# Patient Record
Sex: MALE | Race: WHITE | Employment: FULL TIME | RURAL
[De-identification: names, ages, dates, MRNs, and addresses within clinical notes are randomized per-mention and may not be internally consistent; named-entity substitution may affect disease eponyms.]

---

## 2017-04-06 NOTE — LETTER
4/7/2017 4:26 PM 
 
Mr. Hakan Young [address] [address] Dear  Jenny Meeter: 
 
Guevara Herrera missed you! Please call our office at [phone] and schedule a follow up appointment for your continued care. Sincerely, DAYANARA GOMEZ & GALO MATTHEWS [facility] [place]

## 2017-04-07 RX ORDER — LISINOPRIL 20 MG/1
TABLET ORAL
Qty: 30 TAB | Refills: 0 | Status: SHIPPED | OUTPATIENT
Start: 2017-04-07 | End: 2017-05-12 | Stop reason: SDUPTHER

## 2017-05-12 RX ORDER — LISINOPRIL 20 MG/1
TABLET ORAL
Qty: 10 TAB | Refills: 0 | Status: SHIPPED | OUTPATIENT
Start: 2017-05-12 | End: 2017-06-12 | Stop reason: SDUPTHER

## 2017-06-12 ENCOUNTER — OFFICE VISIT (OUTPATIENT)
Dept: FAMILY MEDICINE CLINIC | Age: 32
End: 2017-06-12

## 2017-06-12 VITALS
OXYGEN SATURATION: 96 % | RESPIRATION RATE: 20 BRPM | SYSTOLIC BLOOD PRESSURE: 136 MMHG | DIASTOLIC BLOOD PRESSURE: 87 MMHG | HEART RATE: 76 BPM | HEIGHT: 74 IN | BODY MASS INDEX: 40.43 KG/M2 | WEIGHT: 315 LBS | TEMPERATURE: 97.2 F

## 2017-06-12 DIAGNOSIS — Z72.0 TOBACCO CHEW USE: ICD-10-CM

## 2017-06-12 DIAGNOSIS — I10 ESSENTIAL HYPERTENSION: Primary | ICD-10-CM

## 2017-06-12 DIAGNOSIS — K21.00 GASTROESOPHAGEAL REFLUX DISEASE WITH ESOPHAGITIS: ICD-10-CM

## 2017-06-12 DIAGNOSIS — F41.9 ANXIETY: ICD-10-CM

## 2017-06-12 RX ORDER — OMEPRAZOLE 40 MG/1
40 CAPSULE, DELAYED RELEASE ORAL DAILY
Qty: 30 CAP | Refills: 11 | Status: SHIPPED | OUTPATIENT
Start: 2017-06-12 | End: 2018-03-08 | Stop reason: SDUPTHER

## 2017-06-12 RX ORDER — LISINOPRIL 20 MG/1
TABLET ORAL
Qty: 10 TAB | Refills: 0 | Status: SHIPPED | OUTPATIENT
Start: 2017-06-12 | End: 2017-11-20 | Stop reason: SDUPTHER

## 2017-06-12 RX ORDER — BUSPIRONE HYDROCHLORIDE 15 MG/1
15 TABLET ORAL
Qty: 90 TAB | Refills: 3 | Status: SHIPPED | OUTPATIENT
Start: 2017-06-12 | End: 2019-11-21

## 2017-06-12 NOTE — MR AVS SNAPSHOT
Visit Information Date & Time Provider Department Dept. Phone Encounter #  
 6/12/2017 10:20 AM Brad Salazar MD [facility] [id number] Follow-up Instructions Return in about 6 months (around 12/12/2017) for f/u HTN, anxiety, GERD. Upcoming Health Maintenance Date Due DTaP/Tdap/Td series (1 - Tdap) 1/9/2007 INFLUENZA AGE 9 TO ADULT 8/1/2017 Allergies as of 6/12/2017  Review Complete On: 6/12/2017 By: Pearl Raman LPN Severity Noted Reaction Type Reactions Percocet [Oxycodone-acetaminophen] Medium 02/17/2014   Topical Itching Current Immunizations  Reviewed on 2/12/2014 Name Date Influenza Vaccine (Madin Chen Canine Kidney) PF 10/11/2014 11:47 AM  
  
 Not reviewed this visit You Were Diagnosed With   
  
 Codes Comments Essential hypertension    -  Primary ICD-10-CM: I10 
ICD-9-CM: 401.9 Gastroesophageal reflux disease with esophagitis     ICD-10-CM: K21.0 ICD-9-CM: 530.11 Anxiety     ICD-10-CM: F41.9 ICD-9-CM: 300.00 Vitals BP Pulse Temp Resp Height(growth percentile) Weight(growth percentile) 136/87 (BP 1 Location: Left arm, BP Patient Position: Sitting) 76 97.2 °F (36.2 °C) (Oral) 20 6' 2\" (1.88 m) 318 lb (144.2 kg) SpO2 BMI Smoking Status 96% 40.83 kg/m2 Former Smoker Vitals History BMI and BSA Data Body Mass Index Body Surface Area  
 40.83 kg/m 2 2.74 m 2 Preferred Pharmacy Pharmacy Name Phone [facility] [address] [address] [phone] Your Updated Medication List  
  
   
This list is accurate as of: 6/12/17 10:56 AM.  Always use your most recent med list.  
  
  
  
  
 busPIRone 15 mg tablet Commonly known as:  BUSPAR Take 1 Tab by mouth three (3) times daily as needed. FISH OIL 1,000 mg Cap Generic drug:  omega-3 fatty acids-vitamin e  
 Take 1 capsule by mouth daily. lisinopril 20 mg tablet Commonly known as:  PRINIVIL, ZESTRIL  
TAKE ONE TABLET BY MOUTH ONCE DAILY  
  
 multivitamin tablet Commonly known as:  ONE A DAY Take 1 tablet by mouth daily. omeprazole 40 mg capsule Commonly known as:  PRILOSEC Take 1 Cap by mouth daily. Prescriptions Sent to Pharmacy Refills  
 lisinopril (PRINIVIL, ZESTRIL) 20 mg tablet 0 Sig: TAKE ONE TABLET BY MOUTH ONCE DAILY Class: Normal  
 Pharmacy: [place] [address] Ph #: [phone]  
 busPIRone (BUSPAR) 15 mg tablet 3 Sig: Take 1 Tab by mouth three (3) times daily as needed. Class: Normal  
 Pharmacy: [place] [address] Ph #: [phone] Route: Oral  
 omeprazole (PRILOSEC) 40 mg capsule 11 Sig: Take 1 Cap by mouth daily. Class: Normal  
 Pharmacy: [place] [address] Ph #: [phone] Route: Oral  
  
We Performed the Following LIPID PANEL [10944 CPT(R)] METABOLIC PANEL, COMPREHENSIVE [81981 CPT(R)] Follow-up Instructions Return in about 6 months (around 12/12/2017) for f/u HTN, anxiety, GERD. Patient Instructions DASH Diet: Care Instructions Your Care Instructions The DASH diet is an eating plan that can help lower your blood pressure. DASH stands for Dietary Approaches to Stop Hypertension. Hypertension is high blood pressure. The DASH diet focuses on eating foods that are high in calcium, potassium, and magnesium. These nutrients can lower blood pressure. The foods that are highest in these nutrients are fruits, vegetables, low-fat dairy products, nuts, seeds, and legumes. But taking calcium, potassium, and magnesium supplements instead of eating foods that are high in those nutrients does not have the same effect.  The DASH diet also includes whole grains, fish, and poultry. The DASH diet is one of several lifestyle changes your doctor may recommend to lower your high blood pressure. Your doctor may also want you to decrease the amount of sodium in your diet. Lowering sodium while following the DASH diet can lower blood pressure even further than just the DASH diet alone. Follow-up care is a key part of your treatment and safety. Be sure to make and go to all appointments, and call your doctor if you are having problems. It's also a good idea to know your test results and keep a list of the medicines you take. How can you care for yourself at home? Following the DASH diet · Eat 4 to 5 servings of fruit each day. A serving is 1 medium-sized piece of fruit, ½ cup chopped or canned fruit, 1/4 cup dried fruit, or 4 ounces (½ cup) of fruit juice. Choose fruit more often than fruit juice. · Eat 4 to 5 servings of vegetables each day. A serving is 1 cup of lettuce or raw leafy vegetables, ½ cup of chopped or cooked vegetables, or 4 ounces (½ cup) of vegetable juice. Choose vegetables more often than vegetable juice. · Get 2 to 3 servings of low-fat and fat-free dairy each day. A serving is 8 ounces of milk, 1 cup of yogurt, or 1 ½ ounces of cheese. · Eat 6 to 8 servings of grains each day. A serving is 1 slice of bread, 1 ounce of dry cereal, or ½ cup of cooked rice, pasta, or cooked cereal. Try to choose whole-grain products as much as possible. · Limit lean meat, poultry, and fish to 2 servings each day. A serving is 3 ounces, about the size of a deck of cards. · Eat 4 to 5 servings of nuts, seeds, and legumes (cooked dried beans, lentils, and split peas) each week. A serving is 1/3 cup of nuts, 2 tablespoons of seeds, or ½ cup of cooked beans or peas. · Limit fats and oils to 2 to 3 servings each day. A serving is 1 teaspoon of vegetable oil or 2 tablespoons of salad dressing. · Limit sweets and added sugars to 5 servings or less a week.  A serving is 1 tablespoon jelly or jam, ½ cup sorbet, or 1 cup of lemonade. · Eat less than 2,300 milligrams (mg) of sodium a day. If you limit your sodium to 1,500 mg a day, you can lower your blood pressure even more. Tips for success · Start small. Do not try to make dramatic changes to your diet all at once. You might feel that you are missing out on your favorite foods and then be more likely to not follow the plan. Make small changes, and stick with them. Once those changes become habit, add a few more changes. · Try some of the following: ¨ Make it a goal to eat a fruit or vegetable at every meal and at snacks. This will make it easy to get the recommended amount of fruits and vegetables each day. ¨ Try yogurt topped with fruit and nuts for a snack or healthy dessert. ¨ Add lettuce, tomato, cucumber, and onion to sandwiches. ¨ Combine a ready-made pizza crust with low-fat mozzarella cheese and lots of vegetable toppings. Try using tomatoes, squash, spinach, broccoli, carrots, cauliflower, and onions. ¨ Have a variety of cut-up vegetables with a low-fat dip as an appetizer instead of chips and dip. ¨ Sprinkle sunflower seeds or chopped almonds over salads. Or try adding chopped walnuts or almonds to cooked vegetables. ¨ Try some vegetarian meals using beans and peas. Add garbanzo or kidney beans to salads. Make burritos and tacos with mashed castillo beans or black beans. Where can you learn more? Go to http://mitra-megan.info/. Enter J142 in the search box to learn more about \"DASH Diet: Care Instructions. \" Current as of: March 23, 2016 Content Version: 11.2 © 5615-0803 [employer]. Care instructions adapted under license by [employer] (which disclaims liability or warranty for this information).  If you have questions about a medical condition or this instruction, always ask your healthcare professional. [employer] disclaims any warranty or liability for your use of this information. Introducing [facility]! Magdiel Roberts introduces [facility] patient portal. Now you can access parts of your medical record, email your doctor's office, and request medication refills online. 1. In your internet browser, go to [email or web address] 2. Click on the First Time User? Click Here link in the Sign In box. You will see the New Member Sign Up page. 3. Enter your [facility] Access Code exactly as it appears below. You will not need to use this code after youve completed the sign-up process. If you do not sign up before the expiration date, you must request a new code. · [facility] Access Code: [email or web address] Expires: 9/10/2017 10:30 AM 
 
4. Enter the last four digits of your Social Security Number (xxxx) and Date of Birth (mm/dd/yyyy) as indicated and click Submit. You will be taken to the next sign-up page. 5. Create a [facility] ID. This will be your [facility] login ID and cannot be changed, so think of one that is secure and easy to remember. 6. Create a [facility] password. You can change your password at any time. 7. Enter your Password Reset Question and Answer. This can be used at a later time if you forget your password. 8. Enter your e-mail address. You will receive e-mail notification when new information is available in [address] 9. Click Sign Up. You can now view and download portions of your medical record. 10. Click the Download Summary menu link to download a portable copy of your medical information. If you have questions, please visit the Frequently Asked Questions section of the [facility] website. Remember, [facility] is NOT to be used for urgent needs. For medical emergencies, dial 911. Now available from your iPhone and Android! Please provide this summary of care documentation to your next provider. Your primary care clinician is listed as Demetrius Rocha. If you have any questions after today's visit, please call [phone].

## 2017-06-12 NOTE — PROGRESS NOTES
Reviewed record in preparation for visit and have obtained necessary documentation. Patient did not bring medications to visit for review. Information provided on Advanced Directive, Living Will. Body mass index is 40.83 kg/(m^2).    Health Maintenance Due   Topic Date Due    DTaP/Tdap/Td series (1 - Tdap) 01/09/2007

## 2017-06-12 NOTE — PATIENT INSTRUCTIONS

## 2017-06-12 NOTE — PROGRESS NOTES
CC: f/u HTn and anxiety    HPI: Pt is a 33 y.o. male who presents for f/u HTN and anxiety. He had been well-controlled on Buspar in the past but ran out of his prescription. He takes it only occasionally. He ran out of his lisinopril 3 weeks ago. Pt works as a  and has a hard time making follow-up appointments because he is frequently out of town. He also has a history of GERD that is well-controlled on a PPI. He has done a trial off in the past and his symptoms returned. HTN:  Checking BPs at home?: YES  Range of BPs?: 110-130's/70-80's  Adding salt to foods?: NO  Headaches?: NO  Blurry vision?: NO  Lower extremity edema?: NO  Smoking?: YES - working on quitting chewing tobacco. No longer smoking cigarettes        Past Medical History:   Diagnosis Date    GERD (gastroesophageal reflux disease)     HTN (hypertension) 1/20/2013    Hypertension        History reviewed. No pertinent family history.     Social History   Substance Use Topics    Smoking status: Former Smoker     Packs/day: 1.00     Years: 10.00     Quit date: 5/25/2012    Smokeless tobacco: Current User     Types: Snuff    Alcohol use 0.0 oz/week     0 Standard drinks or equivalent per week      Comment: Rare       ROS:  Positive only when bolded  Constitutional: HA, changes in weight  Eyes: Changes in vision  Cardiovascular: Palpitations  Gastrointestinal: Abd pain  Hematologic/lymphatic: NORBERTO    PE:  Visit Vitals    /87 (BP 1 Location: Left arm, BP Patient Position: Sitting)    Pulse 76    Temp 97.2 °F (36.2 °C) (Oral)    Resp 20    Ht 6' 2\" (1.88 m)    Wt 318 lb (144.2 kg)    SpO2 96%    BMI 40.83 kg/m2     Gen: Pt sitting in chair, in NAD  Head: Normocephalic, atraumatic  Eyes: Sclera anicteric, EOM grossly intact, PERRL  Throat: MMM, normal lips, tongue, teeth and gums  Neck: Supple, no LAD, no carotid bruits  CVS: Normal S1, S2, no m/r/g  Resp: CTAB, no wheezes or rales  Extrem: Atraumatic, no cyanosis or edema  Pulses: 2+   Skin: Warm, dry  Neuro: Alert, oriented, appropriate      A/P: Pt is a 33 y.o. male who presents for f/u HTN, GERD and anxiety. BP below goal today despite being out of medication for 3 weeks. Based on home readings he is well-controlled on his current dose and does not feel strongly about coming off medication, so will not attempt a trial off for now. - CMP  - Lipid panel  - Refill lisinopril, buspar and omeprazole  - RTC in 6 months for f/u chronic conditions, or sooner prn      The patient was counseled on the dangers of tobacco use, and was advised to quit. Reviewed strategies to maximize success, including removing cigarettes and smoking materials from environment, stress management and support of family/friends. Discussed diagnoses in detail with patient. Medication risks/benefits/side effects discussed with patient. All of the patient's questions were addressed. The patient understands and agrees with our plan of care. The patient knows to call back if they are unsure of or forget any changes we discussed today or if the symptoms change. The patient received an After-Visit Summary which contains VS, orders, medication list and allergy list. This can be used as a \"mini-medical record\" should they have to seek medical care while out of town. Current Outpatient Prescriptions on File Prior to Visit   Medication Sig Dispense Refill    multivitamin (ONE A DAY) tablet Take 1 tablet by mouth daily.  omega-3 fatty acids-vitamin e (FISH OIL) 1,000 mg cap Take 1 capsule by mouth daily. No current facility-administered medications on file prior to visit.

## 2017-06-13 LAB
ALBUMIN SERPL-MCNC: 4.4 G/DL (ref 3.5–5.5)
ALBUMIN/GLOB SERPL: 1.6 {RATIO} (ref 1.2–2.2)
ALP SERPL-CCNC: 73 IU/L (ref 39–117)
ALT SERPL-CCNC: 34 IU/L (ref 0–44)
AST SERPL-CCNC: 19 IU/L (ref 0–40)
BILIRUB SERPL-MCNC: 0.4 MG/DL (ref 0–1.2)
BUN SERPL-MCNC: 11 MG/DL (ref 6–20)
BUN/CREAT SERPL: 13 (ref 9–20)
CALCIUM SERPL-MCNC: 9.7 MG/DL (ref 8.7–10.2)
CHLORIDE SERPL-SCNC: 101 MMOL/L (ref 96–106)
CHOLEST SERPL-MCNC: 154 MG/DL (ref 100–199)
CO2 SERPL-SCNC: 23 MMOL/L (ref 18–29)
CREAT SERPL-MCNC: 0.88 MG/DL (ref 0.76–1.27)
GLOBULIN SER CALC-MCNC: 2.8 G/DL (ref 1.5–4.5)
GLUCOSE SERPL-MCNC: 96 MG/DL (ref 65–99)
HDLC SERPL-MCNC: 27 MG/DL
LDLC SERPL CALC-MCNC: 82 MG/DL (ref 0–99)
POTASSIUM SERPL-SCNC: 5.1 MMOL/L (ref 3.5–5.2)
PROT SERPL-MCNC: 7.2 G/DL (ref 6–8.5)
SODIUM SERPL-SCNC: 141 MMOL/L (ref 134–144)
TRIGL SERPL-MCNC: 227 MG/DL (ref 0–149)
VLDLC SERPL CALC-MCNC: 45 MG/DL (ref 5–40)

## 2017-07-13 ENCOUNTER — TELEPHONE (OUTPATIENT)
Dept: SLEEP MEDICINE | Age: 32
End: 2017-07-13

## 2017-07-13 NOTE — TELEPHONE ENCOUNTER
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

## 2017-07-13 NOTE — TELEPHONE ENCOUNTER
90 CPAP compliance download faxed to requested location. Download revealed 91% adherence between 4/14/17 and 7/12/17. AHI and leak are within normal limits.

## 2017-11-20 DIAGNOSIS — I10 ESSENTIAL HYPERTENSION: ICD-10-CM

## 2017-11-21 RX ORDER — LISINOPRIL 20 MG/1
TABLET ORAL
Qty: 30 TAB | Refills: 3 | Status: SHIPPED | OUTPATIENT
Start: 2017-11-21 | End: 2017-12-20 | Stop reason: SDUPTHER

## 2017-12-20 DIAGNOSIS — I10 ESSENTIAL HYPERTENSION: ICD-10-CM

## 2017-12-20 RX ORDER — LISINOPRIL 20 MG/1
TABLET ORAL
Qty: 30 TAB | Refills: 1 | Status: SHIPPED | OUTPATIENT
Start: 2017-12-20 | End: 2018-02-28 | Stop reason: SDUPTHER

## 2017-12-20 NOTE — TELEPHONE ENCOUNTER
Requested Prescriptions     Signed Prescriptions Disp Refills    lisinopril (PRINIVIL, ZESTRIL) 20 mg tablet 30 Tab 1     Sig: TAKE ONE TABLET BY MOUTH ONCE DAILY     Authorizing Provider: Ginny Prader D     Ordering User: Dima owens per Dr. Harrington Lesch

## 2017-12-20 NOTE — TELEPHONE ENCOUNTER
----- Message from Luis Eduardo Sun sent at 12/20/2017  9:53 AM EST -----  Regarding: Ramirez/Rx  Pts wife Nuno Alexander is requesting a Rx for Lisinopril called to [address] [place] [address]. She stated he is a  and he is coming though town today about 5:00pm.Pt has no medication left. Ms Pearl Jordan number is [phone].

## 2018-02-28 DIAGNOSIS — I10 ESSENTIAL HYPERTENSION: ICD-10-CM

## 2018-02-28 RX ORDER — LISINOPRIL 20 MG/1
TABLET ORAL
Qty: 30 TAB | Refills: 0 | Status: SHIPPED | OUTPATIENT
Start: 2018-02-28 | End: 2018-03-08 | Stop reason: SDUPTHER

## 2018-02-28 RX ORDER — LISINOPRIL 20 MG/1
TABLET ORAL
Qty: 30 TAB | Refills: 1 | Status: CANCELLED | OUTPATIENT
Start: 2018-02-28

## 2018-02-28 NOTE — TELEPHONE ENCOUNTER
Needs refill for Lisinopril. Patient is out of medication. Pharmacy is [facility] in [place]. Best contact number for patient is [phone] or  [phone]. Laray Sever is a  and is requesting for refill to be expedited. Last office visit was 6/12/17. Message taken by Call Center.

## 2018-03-01 NOTE — TELEPHONE ENCOUNTER
Called and informed patient's wife (on HIPPA) that Dr. Magui Long refilled his rx for 30 days, however, he needs an appointment to be seen. She states he will call next week to schedule one.

## 2018-03-08 ENCOUNTER — OFFICE VISIT (OUTPATIENT)
Dept: FAMILY MEDICINE CLINIC | Age: 33
End: 2018-03-08

## 2018-03-08 VITALS
WEIGHT: 315 LBS | DIASTOLIC BLOOD PRESSURE: 82 MMHG | TEMPERATURE: 97.5 F | HEIGHT: 74 IN | OXYGEN SATURATION: 97 % | BODY MASS INDEX: 40.43 KG/M2 | HEART RATE: 71 BPM | RESPIRATION RATE: 20 BRPM | SYSTOLIC BLOOD PRESSURE: 136 MMHG

## 2018-03-08 DIAGNOSIS — L98.9 SKIN LESION: ICD-10-CM

## 2018-03-08 DIAGNOSIS — I10 ESSENTIAL HYPERTENSION: Primary | ICD-10-CM

## 2018-03-08 DIAGNOSIS — K21.00 GASTROESOPHAGEAL REFLUX DISEASE WITH ESOPHAGITIS: ICD-10-CM

## 2018-03-08 PROBLEM — E66.01 OBESITY, MORBID (HCC): Status: ACTIVE | Noted: 2018-03-08

## 2018-03-08 RX ORDER — OMEPRAZOLE 40 MG/1
40 CAPSULE, DELAYED RELEASE ORAL DAILY
Qty: 90 CAP | Refills: 3 | Status: SHIPPED | OUTPATIENT
Start: 2018-03-08 | End: 2019-11-21

## 2018-03-08 RX ORDER — LISINOPRIL 20 MG/1
TABLET ORAL
Qty: 90 TAB | Refills: 3 | Status: SHIPPED | OUTPATIENT
Start: 2018-03-08 | End: 2019-03-23 | Stop reason: SDUPTHER

## 2018-03-08 NOTE — PATIENT INSTRUCTIONS
Skin Lesion Removal: What to Expect at Home  Your Recovery  After your procedure, you should not have much pain. But some soreness, swelling, or bruising is normal. Your doctor may recommend over-the-counter medicines to help with any discomfort. Most people can return to their normal routine the same day of their procedure. How quickly your wound heals depends on the size of your wound and the type of procedure you had. Most wounds take 1 to 3 weeks to heal. If you had laser surgery, your skin may change color and then slowly return to its normal color. You may need only a bandage, or you may need stitches. If you had stitches, your doctor will probably remove them 5 to 14 days later. If you have the type of stitches that dissolve, they do not have to be removed. They will disappear on their own. This care sheet gives you a general idea about how long it will take for you to recover. But each person recovers at a different pace. Follow the steps below to get better as quickly as possible. How can you care for yourself at home? Activity  ? · For the first few days, try not to bump or knock your wound. ? · Depending on where your wound is, you may need to avoid strenuous exercise for 2 weeks after the procedure or until your doctor says it is okay. ? · If you have had a lesion removed from your face, do not use makeup near your wound until you have your stitches taken out. ? · Ask your doctor when it is okay to shower, bathe, or swim. Medicines  ? · Your doctor will tell you if and when you can restart your medicines. He or she will also give you instructions about taking any new medicines. ? · If you take blood thinners, such as warfarin (Coumadin), clopidogrel (Plavix), or aspirin, be sure to talk to your doctor. He or she will tell you if and when to start taking those medicines again. Make sure that you understand exactly what your doctor wants you to do. ? · Be safe with medicines.  Take pain medicines exactly as directed. ¨ If the doctor gave you a prescription medicine for pain, take it as prescribed. ¨ If you are not taking a prescription pain medicine, ask your doctor if you can take an over-the-counter medicine. ? Wound care  ? · If your doctor told you how to care for your incision, follow your doctor's instructions. If you did not get instructions, follow this general advice:  ¨ Keep the wound bandaged and dry for the first day. ¨ After the first 24 to 48 hours, wash around the wound with clean water 2 times a day. Don't use hydrogen peroxide or alcohol, which can slow healing. ¨ You may cover the wound with a thin layer of petroleum jelly, such as Vaseline, and a nonstick bandage. ¨ Apply more petroleum jelly and replace the bandage as needed. ? · If you have stitches, you may get other instructions. ? · If a scab forms, do not pull it off. Let it fall off on its own. Wounds heal faster if no scab forms. Washing the area every day and using petroleum jelly will help prevent a scab from forming. ? · If the wound bleeds, put direct pressure on it with a clean cloth until the bleeding stops. ? · If you had a growth \"frozen\" off, you may get a blister. Do not break it. Let it dry up on its own. It is common for the blister to fill with blood. You do not need to do anything about this, but if it becomes too painful, call your doctor. ? · Avoid the sun until your stitches are removed. Follow-up care is a key part of your treatment and safety. Be sure to make and go to all appointments, and call your doctor if you are having problems. It's also a good idea to know your test results and keep a list of the medicines you take. When should you call for help? Call 911 anytime you think you may need emergency care. For example, call if:  ? · You passed out (lost consciousness). ? · You have severe trouble breathing.    ? · You have sudden chest pain and shortness of breath, or you cough up blood. ?Call your doctor now or seek immediate medical care if:  ? · You have symptoms of a blood clot in your leg (called a deep vein thrombosis), such as:  ¨ Pain in the calf, back of the knee, thigh, or groin. ¨ Redness and swelling in your leg or groin. ? · You have signs of infection, such as:  ¨ Increased pain, swelling, warmth, or redness. ¨ Red streaks leading from the wound. ¨ Pus draining from the wound. ¨ A fever. ? · You have pain that does not get better after you take pain medicine. ? · You have loose stitches. ? Watch closely for changes in your health, and be sure to contact your doctor if you have any problems. Where can you learn more? Go to http://mitra-megan.info/. Enter Q228 in the search box to learn more about \"Skin Lesion Removal: What to Expect at Home. \"  Current as of: October 13, 2016  Content Version: 11.4  © 3463-0634 [employer]. Care instructions adapted under license by [employer] (which disclaims liability or warranty for this information). If you have questions about a medical condition or this instruction, always ask your healthcare professional. [address] [address] any warranty or liability for your use of this information.

## 2018-03-08 NOTE — PROGRESS NOTES
Reviewed record in preparation for visit and have obtained necessary documentation. Patient did not bring medications to visit for review. Information provided on Advanced Directive, Living Will. Body mass index is 42.75 kg/(m^2). Health Maintenance Due   Topic Date Due    DTaP/Tdap/Td series (1 - Tdap) 01/09/2007    Influenza Age 5 to Adult  08/01/2017     1. Have you been to the ER, urgent care clinic since your last visit? Hospitalized since your last visit? no    2. Have you seen or consulted any other health care providers outside of the [address] [place] [address] since your last visit? Include any pap smears or colon screening.  no

## 2018-03-08 NOTE — MR AVS SNAPSHOT
[address] [address] 
[phone] Patient: Shelly Meyer MRN: [id number] :1985 Visit Information Date & Time Provider Department Dept. Phone Encounter #  
 3/8/2018  8:00 AM Lily De Leon MD [address] [place] [id number] Follow-up Instructions Return in about 6 months (around 2018) for f/u HTN. Upcoming Health Maintenance Date Due DTaP/Tdap/Td series (1 - Tdap) 2007 Influenza Age 5 to Adult 2017 Allergies as of 3/8/2018  Review Complete On: 3/8/2018 By: Huey Leonrado LPN Severity Noted Reaction Type Reactions Percocet [Oxycodone-acetaminophen] Medium 2014   Topical Itching Current Immunizations  Reviewed on 2014 Name Date Influenza Vaccine (Madin Chen Canine Kidney) PF 10/11/2014 11:47 AM  
  
 Not reviewed this visit You Were Diagnosed With   
  
 Codes Comments Essential hypertension    -  Primary ICD-10-CM: I10 
ICD-9-CM: 401.9 BMI 40.0-44.9, adult [facility])     ICD-10-CM: Z68.41 
ICD-9-CM: V85.41 Gastroesophageal reflux disease with esophagitis     ICD-10-CM: K21.0 ICD-9-CM: 530.11 Skin lesion     ICD-10-CM: L98.9 ICD-9-CM: 709.9 Vitals BP Pulse Temp Resp Height(growth percentile) Weight(growth percentile) 136/82 (BP 1 Location: Left arm, BP Patient Position: Sitting) 71 97.5 °F (36.4 °C) (Oral) 20 6' 2\" (1.88 m) 333 lb (151 kg) SpO2 BMI Smoking Status 97% 42.75 kg/m2 Former Smoker Vitals History BMI and BSA Data Body Mass Index Body Surface Area 42.75 kg/m 2 2.81 m 2 Preferred Pharmacy Pharmacy Name Phone [address] [address] [phone] Your Updated Medication List  
  
   
This list is accurate as of 3/8/18  8:44 AM.  Always use your most recent med list.  
  
  
  
  
 busPIRone 15 mg tablet Commonly known as:  BUSPAR Take 1 Tab by mouth three (3) times daily as needed. lisinopril 20 mg tablet Commonly known as:  PRINIVIL, ZESTRIL  
TAKE ONE TABLET BY MOUTH ONCE DAILY  
  
 omeprazole 40 mg capsule Commonly known as:  PRILOSEC Take 1 Cap by mouth daily. Prescriptions Sent to Pharmacy Refills  
 lisinopril (PRINIVIL, ZESTRIL) 20 mg tablet 3 Sig: TAKE ONE TABLET BY MOUTH ONCE DAILY Class: Normal  
 Pharmacy: [facility] [address] [facility] [address] [address] Ph #: [phone]  
 omeprazole (PRILOSEC) 40 mg capsule 3 Sig: Take 1 Cap by mouth daily. Class: Normal  
 Pharmacy: [facility] [address] [place] [address] Ph #: [phone] Route: Oral  
  
We Performed the Following METABOLIC PANEL, BASIC [57035 CPT(R)] Follow-up Instructions Return in about 6 months (around 9/8/2018) for f/u HTN. Patient Instructions Skin Lesion Removal: What to Expect at Home Your Recovery After your procedure, you should not have much pain. But some soreness, swelling, or bruising is normal. Your doctor may recommend over-the-counter medicines to help with any discomfort. Most people can return to their normal routine the same day of their procedure. How quickly your wound heals depends on the size of your wound and the type of procedure you had. Most wounds take 1 to 3 weeks to heal. If you had laser surgery, your skin may change color and then slowly return to its normal color. You may need only a bandage, or you may need stitches. If you had stitches, your doctor will probably remove them 5 to 14 days later. If you have the type of stitches that dissolve, they do not have to be removed. They will disappear on their own. This care sheet gives you a general idea about how long it will take for you to recover. But each person recovers at a different pace.  Follow the steps below to get better as quickly as possible. How can you care for yourself at home? Activity ? · For the first few days, try not to bump or knock your wound. ? · Depending on where your wound is, you may need to avoid strenuous exercise for 2 weeks after the procedure or until your doctor says it is okay. ? · If you have had a lesion removed from your face, do not use makeup near your wound until you have your stitches taken out. ? · Ask your doctor when it is okay to shower, bathe, or swim. Medicines ? · Your doctor will tell you if and when you can restart your medicines. He or she will also give you instructions about taking any new medicines. ? · If you take blood thinners, such as warfarin (Coumadin), clopidogrel (Plavix), or aspirin, be sure to talk to your doctor. He or she will tell you if and when to start taking those medicines again. Make sure that you understand exactly what your doctor wants you to do. ? · Be safe with medicines. Take pain medicines exactly as directed. ¨ If the doctor gave you a prescription medicine for pain, take it as prescribed. ¨ If you are not taking a prescription pain medicine, ask your doctor if you can take an over-the-counter medicine. ? Wound care ? · If your doctor told you how to care for your incision, follow your doctor's instructions. If you did not get instructions, follow this general advice: ¨ Keep the wound bandaged and dry for the first day. ¨ After the first 24 to 48 hours, wash around the wound with clean water 2 times a day. Don't use hydrogen peroxide or alcohol, which can slow healing. ¨ You may cover the wound with a thin layer of petroleum jelly, such as Vaseline, and a nonstick bandage. ¨ Apply more petroleum jelly and replace the bandage as needed. ? · If you have stitches, you may get other instructions. ? · If a scab forms, do not pull it off. Let it fall off on its own. Wounds heal faster if no scab forms. Washing the area every day and using petroleum jelly will help prevent a scab from forming. ? · If the wound bleeds, put direct pressure on it with a clean cloth until the bleeding stops. ? · If you had a growth \"frozen\" off, you may get a blister. Do not break it. Let it dry up on its own. It is common for the blister to fill with blood. You do not need to do anything about this, but if it becomes too painful, call your doctor. ? · Avoid the sun until your stitches are removed. Follow-up care is a key part of your treatment and safety. Be sure to make and go to all appointments, and call your doctor if you are having problems. It's also a good idea to know your test results and keep a list of the medicines you take. When should you call for help? Call 911 anytime you think you may need emergency care. For example, call if: 
? · You passed out (lost consciousness). ? · You have severe trouble breathing. ? · You have sudden chest pain and shortness of breath, or you cough up blood. ?Call your doctor now or seek immediate medical care if: 
? · You have symptoms of a blood clot in your leg (called a deep vein thrombosis), such as: 
¨ Pain in the calf, back of the knee, thigh, or groin. ¨ Redness and swelling in your leg or groin. ? · You have signs of infection, such as: 
¨ Increased pain, swelling, warmth, or redness. ¨ Red streaks leading from the wound. ¨ Pus draining from the wound. ¨ A fever. ? · You have pain that does not get better after you take pain medicine. ? · You have loose stitches. ? Watch closely for changes in your health, and be sure to contact your doctor if you have any problems. Where can you learn more? Go to http://mitra-megan.info/. Enter Q228 in the search box to learn more about \"Skin Lesion Removal: What to Expect at Home. \" Current as of: October 13, 2016 Content Version: 11.4 © 6815-5214 Healthwise, Incorporated. Care instructions adapted under license by [employer] (which disclaims liability or warranty for this information). If you have questions about a medical condition or this instruction, always ask your healthcare professional. [address] any warranty or liability for your use of this information. Introducing [facility]! Donarenita Ac introduces [employer] patient portal. Now you can access parts of your medical record, email your doctor's office, and request medication refills online. 1. In your internet browser, go to [email or web address] 2. Click on the First Time User? Click Here link in the Sign In box. You will see the New Member Sign Up page. 3. Enter your [employer] Access Code exactly as it appears below. You will not need to use this code after youve completed the sign-up process. If you do not sign up before the expiration date, you must request a new code. · [employer] Access Code: [email or web address] Expires: 6/6/2018  7:56 AM 
 
4. Enter the last four digits of your Social Security Number (xxxx) and Date of Birth (mm/dd/yyyy) as indicated and click Submit. You will be taken to the next sign-up page. 5. Create a [employer] ID. This will be your [employer] login ID and cannot be changed, so think of one that is secure and easy to remember. 6. Create a [employer] password. You can change your password at any time. 7. Enter your Password Reset Question and Answer. This can be used at a later time if you forget your password. 8. Enter your e-mail address. You will receive e-mail notification when new information is available in [address] 9. Click Sign Up. You can now view and download portions of your medical record. 10. Click the Download Summary menu link to download a portable copy of your medical information.  
 
If you have questions, please visit the Frequently Asked Questions section of the [employer]. Remember, [employer] is NOT to be used for urgent needs. For medical emergencies, dial 911. Now available from your iPhone and Android! Please provide this summary of care documentation to your next provider. Your primary care clinician is listed as [address] If you have any questions after today's visit, please call [phone].

## 2018-03-08 NOTE — PROGRESS NOTES
CC: f/u HTN and GERD    HPI: Pt is a 29 y.o. male who presents for f/u HTN and GERD. He is doing well on his current regimen but forgot to take his lisinopril yesterday. His GERD is well controlled on PPI. He has some moles and skin tags around his neck and his right axilla that he would like removed. HTN:  Checking BPs at home?: YES, occasionally, ranges 120's/80's. No logs today. 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 No pertinent family history. Social History   Substance Use Topics    Smoking status: Former Smoker     Packs/day: 1.00     Years: 10.00     Quit date: 5/25/2012    Smokeless tobacco: Current User     Types: Snuff    Alcohol use 0.0 oz/week     0 Standard drinks or equivalent per week      Comment: Rare       ROS:  Positive only when bolded  Constitutional: HA  Eyes: Changes in vision  Respiratory: SOB, wheezing, cough  Cardiovascular: CP, palpitations  Hematologic/lymphatic: NORBERTO    PE:  Visit Vitals    /82 (BP 1 Location: Left arm, BP Patient Position: Sitting)    Pulse 71    Temp 97.5 °F (36.4 °C) (Oral)    Resp 20    Ht 6' 2\" (1.88 m)    Wt 333 lb (151 kg)    SpO2 97%    BMI 42.75 kg/m2     Gen: Pt sitting in chair, in NAD  Head: Normocephalic, atraumatic  Eyes: Sclera anicteric, EOM grossly intact, PERRL  Throat: MMM, normal lips, tongue and gums  Neck: Supple  CVS: Normal S1, S2, no m/r/g  Resp: CTAB, no wheezes or rales  Extrem: Atraumatic, no cyanosis or edema  Pulses: 2+   Skin: Warm, dry. Two 2-3mm skin tags on left neck. One 3mm pigmented nevus anterior to right axilla. Neuro: Alert, oriented, appropriate      A/P: Pt is a 29 y.o. male who presents for f/u HTN and skin lesions.    - BMP  - Moles and skin tags removed with liquid nitrogen. Pt advised on normal course of healing and appearance of lesions during this time. Advised to call if he develops increasing erythema, drainage or fevers. - Will plan to check lipid panel yearly, due at next visit  - RTC in 6 months for f/u HTN      Discussed diagnoses in detail with patient. Medication risks/benefits/side effects discussed with patient. All of the patient's questions were addressed. The patient understands and agrees with our plan of care. The patient knows to call back if they are unsure of or forget any changes we discussed today or if the symptoms change. The patient received an After-Visit Summary which contains VS, orders, medication list and allergy list. This can be used as a \"mini-medical record\" should they have to seek medical care while out of town. Current Outpatient Prescriptions on File Prior to Visit   Medication Sig Dispense Refill    lisinopril (PRINIVIL, ZESTRIL) 20 mg tablet TAKE ONE TABLET BY MOUTH ONCE DAILY 30 Tab 0    busPIRone (BUSPAR) 15 mg tablet Take 1 Tab by mouth three (3) times daily as needed. 90 Tab 3    omeprazole (PRILOSEC) 40 mg capsule Take 1 Cap by mouth daily. 30 Cap 11    multivitamin (ONE A DAY) tablet Take 1 tablet by mouth daily.  omega-3 fatty acids-vitamin e (FISH OIL) 1,000 mg cap Take 1 capsule by mouth daily. No current facility-administered medications on file prior to visit.

## 2018-03-09 LAB
BUN SERPL-MCNC: 16 MG/DL (ref 6–20)
BUN/CREAT SERPL: 18 (ref 9–20)
CALCIUM SERPL-MCNC: 9.5 MG/DL (ref 8.7–10.2)
CHLORIDE SERPL-SCNC: 101 MMOL/L (ref 96–106)
CO2 SERPL-SCNC: 23 MMOL/L (ref 18–29)
CREAT SERPL-MCNC: 0.91 MG/DL (ref 0.76–1.27)
GFR SERPLBLD CREATININE-BSD FMLA CKD-EPI: 111 ML/MIN/1.73
GFR SERPLBLD CREATININE-BSD FMLA CKD-EPI: 128 ML/MIN/1.73
GLUCOSE SERPL-MCNC: 89 MG/DL (ref 65–99)
POTASSIUM SERPL-SCNC: 4.9 MMOL/L (ref 3.5–5.2)
SODIUM SERPL-SCNC: 141 MMOL/L (ref 134–144)

## 2018-07-09 ENCOUNTER — DOCUMENTATION ONLY (OUTPATIENT)
Dept: SLEEP MEDICINE | Age: 33
End: 2018-07-09

## 2019-04-29 ENCOUNTER — OFFICE VISIT (OUTPATIENT)
Dept: FAMILY MEDICINE CLINIC | Age: 34
End: 2019-04-29

## 2019-04-29 VITALS
SYSTOLIC BLOOD PRESSURE: 129 MMHG | WEIGHT: 315 LBS | RESPIRATION RATE: 24 BRPM | HEART RATE: 67 BPM | BODY MASS INDEX: 40.43 KG/M2 | OXYGEN SATURATION: 97 % | DIASTOLIC BLOOD PRESSURE: 78 MMHG | TEMPERATURE: 98.1 F | HEIGHT: 74 IN

## 2019-04-29 DIAGNOSIS — I10 ESSENTIAL HYPERTENSION: Primary | ICD-10-CM

## 2019-04-29 DIAGNOSIS — Z72.0 TOBACCO CHEW USE: ICD-10-CM

## 2019-04-29 RX ORDER — LISINOPRIL 20 MG/1
TABLET ORAL
Qty: 90 TAB | Refills: 1 | Status: SHIPPED | OUTPATIENT
Start: 2019-04-29 | End: 2019-11-01 | Stop reason: SDUPTHER

## 2019-04-29 NOTE — PROGRESS NOTES
1. Have you been to the ER, urgent care clinic since your last visit? Hospitalized since your last visit? No 
 
2. Have you seen or consulted any other health care providers outside of the [address] [place] [address] since your last visit? Include any pap smears or colon screening. No 
Reviewed record in preparation for visit and have necessary documentation Pt did not bring medication to office visit for review Goals that were addressed and/or need to be completed during or after this appointment include Health Maintenance Due Topic Date Due  
 DTaP/Tdap/Td series (1 - Tdap) 01/09/2007

## 2019-04-29 NOTE — PATIENT INSTRUCTIONS
Learning About High Blood Pressure What is high blood pressure? Blood pressure is a measure of how hard the blood pushes against the walls of your arteries. It's normal for blood pressure to go up and down throughout the day, but if it stays up, you have high blood pressure. Another name for high blood pressure is hypertension. Two numbers tell you your blood pressure. The first number is the systolic pressure. It shows how hard the blood pushes when your heart is pumping. The second number is the diastolic pressure. It shows how hard the blood pushes between heartbeats, when your heart is relaxed and filling with blood. Your doctor will give you a goal for your blood pressure. Your goal will be based on your health and your age. High blood pressure (hypertension) means that the top number stays high, or the bottom number stays high, or both. High blood pressure increases the risk of stroke, heart attack, and other problems. You and your doctor will talk about your risks of these problems based on your blood pressure. What happens when you have high blood pressure? · Blood flows through your arteries with too much force. Over time, this damages the walls of your arteries. But you can't feel it. High blood pressure usually doesn't cause symptoms. · Fat and calcium start to build up in your arteries. This buildup is called plaque. Plaque makes your arteries narrower and stiffer. Blood can't flow through them as easily. · This lack of good blood flow starts to damage some of the organs in your body. This can lead to problems such as coronary artery disease and heart attack, heart failure, stroke, kidney failure, and eye damage. How can you prevent high blood pressure? · Stay at a healthy weight. · Try to limit how much sodium you eat to less than 2,300 milligrams (mg) a day. If you limit your sodium to 1,500 mg a day, you can lower your blood pressure even more. ? Buy foods that are labeled \"unsalted,\" \"sodium-free,\" or \"low-sodium. \" Foods labeled \"reduced-sodium\" and \"light sodium\" may still have too much sodium. ? Flavor your food with garlic, lemon juice, onion, vinegar, herbs, and spices instead of salt. Do not use soy sauce, steak sauce, onion salt, garlic salt, mustard, or ketchup on your food. ? Use less salt (or none) when recipes call for it. You can often use half the salt a recipe calls for without losing flavor. · Be physically active. Get at least 30 minutes of exercise on most days of the week. Walking is a good choice. You also may want to do other activities, such as running, swimming, cycling, or playing tennis or team sports. · Limit alcohol to 2 drinks a day for men and 1 drink a day for women. · Eat plenty of fruits, vegetables, and low-fat dairy products. Eat less saturated and total fats. How is high blood pressure treated? · Your doctor will suggest making lifestyle changes. For example, your doctor may ask you to eat healthy foods, quit smoking, lose extra weight, and be more active. · If lifestyle changes don't help enough, your doctor may recommend that you take medicine. · When blood pressure is very high, medicines are needed to lower it. Follow-up care is a key part of your treatment and safety. Be sure to make and go to all appointments, and call your doctor if you are having problems. It's also a good idea to know your test results and keep a list of the medicines you take. Where can you learn more? Go to http://mitra-megan.info/. Enter P501 in the search box to learn more about \"Learning About High Blood Pressure. \" Current as of: July 22, 2018 Content Version: 11.9 © 2779-9927 [employer]. Care instructions adapted under license by [employer] (which disclaims liability or warranty for this information).  If you have questions about a medical condition or this instruction, always ask your healthcare professional. [address] [address] any warranty or liability for your use of this information.

## 2019-04-29 NOTE — PROGRESS NOTES
Patient: Melisa Castillo MRN: [id number]  SSN: [id number] YOB: 1985  Age: 36 y.o. Sex: male Subjective: Chief Complaint Patient presents with  Medication Refill HPI: he is a 36y.o. year old male who presents for medication refill. Patient [employer] health insurance. Last seen in office 1 year ago. Patient denies HA, dizziness, SOB, CP, abdominal pain, dysuria, myalgias or arthralgias. BP good today. He continues to gain weight. Encounter Diagnoses Name Primary?  Essential hypertension BP Readings from Last 3 Encounters:  
04/29/19 129/78  
03/08/18 136/82  
06/12/17 136/87 Wt Readings from Last 3 Encounters:  
04/29/19 336 lb (152.4 kg) 03/08/18 333 lb (151 kg) 06/12/17 318 lb (144.2 kg) Body mass index is 43.14 kg/m². Current and past medical information: 
 
Current Medications after this visit[de-identified]    
Current Outpatient Medications Medication Sig  
 lisinopril (PRINIVIL, ZESTRIL) 20 mg tablet TAKE 1 TABLET BY MOUTH ONCE DAILY  omeprazole (PRILOSEC) 40 mg capsule Take 1 Cap by mouth daily.  busPIRone (BUSPAR) 15 mg tablet Take 1 Tab by mouth three (3) times daily as needed. No current facility-administered medications for this visit. Patient Active Problem List  
 Diagnosis Date Noted  Obesity, morbid ([address] 03/08/2018  Former smoker 10/13/2014  Appendicitis, acute 02/12/2014  
 HTN (hypertension) 01/20/2013  Anxiety 01/20/2013  GERD (gastroesophageal reflux disease) 01/20/2013 Past Medical History:  
Diagnosis Date  GERD (gastroesophageal reflux disease)  HTN (hypertension) 1/20/2013  Hypertension Allergies Allergen Reactions  Percocet [Oxycodone-Acetaminophen] Itching Past Surgical History:  
Procedure Laterality Date  HX APPENDECTOMY  2/2014 Social History Socioeconomic History  Marital status:  Spouse name: Not on file  Number of children: Not on file  Years of education: Not on file  Highest education level: Not on file Tobacco Use  Smoking status: Former Smoker Packs/day: 1.00 Years: 10.00 Pack years: 10.00 Last attempt to quit: 2012 Years since quittin.9  Smokeless tobacco: Current User Types: Snuff Substance and Sexual Activity  Alcohol use: Yes Alcohol/week: 0.0 oz  
  Comment: Rare  Drug use: No  
 
 
 
Objective:  
 
Review of Systems: 
Constitutional: Negative for fatigue or malaise Derm: Negative for rash or lesion HEENT: Negative for acute hearing or vision changes Cardiovascular: Negative for dizziness, chest pain or palpitations Respiratory: Negative for cough, wheezing or SOB Gastrointestinal: Negative for nausea or abdominal pain Genital/urinary: Negative for dysuria or voiding dysfunction Muscoloskeletal: Negative for acute myalgias or arthralgias Neurological: Negative for headache, weakness or paresthesia Psychological: Negative for depression or anxiety Vitals:  
 19 1538 BP: 129/78 Pulse: 67 Resp: 24 Temp: 98.1 °F (36.7 °C) TempSrc: Oral  
SpO2: 97% Weight: 336 lb (152.4 kg) Height: 6' 2\" (1.88 m) Body mass index is 43.14 kg/m². Physical Exam: 
Constitutional: well developed, well nourished, in no acute distress Skin: warm and dry, normal tone and turgor Head: normocephalic, atraumatic Eyes: sclera clear, EOMI, PERRL Neck: normal range of motion Cardiovascular: normal S1, S2, regular rate and rhythm Respiratory: clear to auscultation bilaterally with symmetrical effort Abdomen: soft, BS normal 
Extremities: full range of motion Neurology: normal strength and sensation Psych: active, alert and oriented, affect appropriate Assessment and orders: ICD-10-CM ICD-9-CM 1. Essential hypertension I10 401.9 lisinopril (PRINIVIL, ZESTRIL) 20 mg tablet    METABOLIC PANEL, BASIC  
 
 
 
 Plan of care: 
Diagnoses were discussed in detail with patient. Strongly advised patient to stop all use of tobacco products. Recommend patient work on weight loss by a decrease in daily caloric intake and an increase in aerobic exercise. Medication risks/benefits/side effects discussed with patient. All of the patient's questions were addressed and answered to apparent satisfaction. The patient understands and agrees with our plan of care. The patient knows to call back if they have questions about the plan of care or if symptoms change. The patient received an After-Visit Summary which contains VS, diagnoses, orders, allergy and medication lists. Patient Care Team: 
Liz Cole MD as PCP - [facility]) Tejas Rich MD (Gastroenterology) Haroldo Moreno MD as Physician (Sleep Medicine) Mark Schwartz MD (Cardiology) Follow-up and Dispositions · Return in about 6 months (around 10/29/2019), or if symptoms worsen or fail to improve. Future Appointments Date Time Provider Jamarcus Muse 10/29/2019 10:00 AM Elroy Woo MD [facility] Signed By: Galina Rolon MD   
 April 29, 2019

## 2019-04-30 LAB
BUN SERPL-MCNC: 12 MG/DL (ref 6–20)
BUN/CREAT SERPL: 15 (ref 9–20)
CALCIUM SERPL-MCNC: 9 MG/DL (ref 8.7–10.2)
CHLORIDE SERPL-SCNC: 107 MMOL/L (ref 96–106)
CO2 SERPL-SCNC: 23 MMOL/L (ref 20–29)
CREAT SERPL-MCNC: 0.78 MG/DL (ref 0.76–1.27)
GLUCOSE SERPL-MCNC: 90 MG/DL (ref 65–99)
POTASSIUM SERPL-SCNC: 4.5 MMOL/L (ref 3.5–5.2)
SODIUM SERPL-SCNC: 141 MMOL/L (ref 134–144)

## 2019-11-01 DIAGNOSIS — I10 ESSENTIAL HYPERTENSION: ICD-10-CM

## 2019-11-01 RX ORDER — LISINOPRIL 20 MG/1
TABLET ORAL
Qty: 30 TAB | Refills: 0 | Status: SHIPPED | OUTPATIENT
Start: 2019-11-01 | End: 2019-11-21 | Stop reason: SDUPTHER

## 2019-11-01 NOTE — TELEPHONE ENCOUNTER
Pt's wife called and states she called and left a message concerning his medications on yesterday as well as Monday. He is a . She told him not to be without his medications on the road. This is why she called early. Her son is going to see him tomorrow. He will not be back in [place] until November 21st. She called and spoke to someone about getting a refill on his medication for his BP. He has one pill left.

## 2019-11-21 ENCOUNTER — HOSPITAL ENCOUNTER (OUTPATIENT)
Dept: LAB | Age: 34
Discharge: HOME | End: 2019-11-21

## 2019-11-21 ENCOUNTER — OFFICE VISIT (OUTPATIENT)
Dept: FAMILY MEDICINE CLINIC | Age: 34
End: 2019-11-21

## 2019-11-21 VITALS
HEART RATE: 69 BPM | DIASTOLIC BLOOD PRESSURE: 80 MMHG | SYSTOLIC BLOOD PRESSURE: 128 MMHG | RESPIRATION RATE: 20 BRPM | TEMPERATURE: 97 F | BODY MASS INDEX: 40.43 KG/M2 | HEIGHT: 74 IN | WEIGHT: 315 LBS | OXYGEN SATURATION: 96 %

## 2019-11-21 DIAGNOSIS — J40 BRONCHITIS: ICD-10-CM

## 2019-11-21 DIAGNOSIS — I10 ESSENTIAL HYPERTENSION: ICD-10-CM

## 2019-11-21 DIAGNOSIS — E78.2 MIXED HYPERLIPIDEMIA: ICD-10-CM

## 2019-11-21 DIAGNOSIS — I10 ESSENTIAL HYPERTENSION: Primary | ICD-10-CM

## 2019-11-21 RX ORDER — DOXYCYCLINE 100 MG/1
100 TABLET ORAL 2 TIMES DAILY
Qty: 20 TAB | Refills: 0 | Status: SHIPPED | OUTPATIENT
Start: 2019-11-21 | End: 2019-12-01

## 2019-11-21 RX ORDER — LISINOPRIL 20 MG/1
TABLET ORAL
Qty: 90 TAB | Refills: 1 | Status: SHIPPED | OUTPATIENT
Start: 2019-11-21 | End: 2020-06-08

## 2019-11-21 NOTE — PROGRESS NOTES
1. Have you been to the ER, urgent care clinic since your last visit? Hospitalized since your last visit? no    2. Have you seen or consulted any other health care providers outside of the [address] [place] [address] since your last visit? Include any pap smears or colon screening. no  Reviewed record in preparation for visit and have obtained necessary documentation. Patient did not bring medications to visit for review. Information provided on Advanced Directive, Living Will. Body mass index is 46.09 kg/m².    Health Maintenance Due   Topic Date Due    DTaP/Tdap/Td series (1 - Tdap) 01/09/1997    Influenza Age 5 to Adult  08/01/2019

## 2019-11-21 NOTE — PROGRESS NOTES
CC: f/u HTN    HPI: Pt is a 36 y.o. male who presents for f/u HTN. He has also been having cough productive of yellow/green phlegm, fever, sinus congestion. Symptoms have been present for the past 2 weeks and were initially getting better but now getting worse again. Several sick contacts with similar symptoms. He has tried DayQuil and NyQuil without improvement. HTN:  Checking BPs at home?: NO  Headaches?: NO  Blurry vision?: NO  Lower extremity edema?: NO  Smoking?: NO      Past Medical History:   Diagnosis Date    GERD (gastroesophageal reflux disease)     HTN (hypertension) 2013    Hypertension        History reviewed. No pertinent family history. Social History     Tobacco Use    Smoking status: Former Smoker     Packs/day: 1.00     Years: 10.00     Pack years: 10.00     Last attempt to quit: 2012     Years since quittin.4    Smokeless tobacco: Current User     Types: Snuff   Substance Use Topics    Alcohol use: Yes     Alcohol/week: 0.0 standard drinks     Comment: Rare    Drug use: No       ROS:  Per HPI    PE:  Visit Vitals  /80 (BP 1 Location: Left arm, BP Patient Position: Sitting)   Pulse 69   Temp 97 °F (36.1 °C) (Oral)   Resp 20   Ht 6' 2\" (1.88 m)   Wt (!) 359 lb (162.8 kg)   SpO2 96%   BMI 46.09 kg/m²     Gen: Pt sitting in chair, in NAD  Head: Normocephalic, atraumatic  Eyes: Sclera anicteric, EOM grossly intact, PERRL  Throat: MMM, normal lips, tongue and gums  Neck: Supple, no LAD  CVS: Normal S1, S2, no m/r/g  Resp: CTAB, no wheezes or rales  Extrem: Atraumatic, no cyanosis or edema  Pulses: 2+   Skin: Warm, dry  Neuro: Alert, oriented, appropriate      A/P:   Encounter Diagnoses     ICD-10-CM ICD-9-CM   1. Essential hypertension I10 401.9   2. Mixed hyperlipidemia E78.2 272.2   3. Bronchitis J40 490     1. Essential hypertension  - METABOLIC PANEL, COMPREHENSIVE; Future    2. Mixed hyperlipidemia  - LIPID PANEL; Future    3.  Bronchitis  - Z-pack       RTC in 6 months for f/u chronic conditions, or sooner prn    Discussed diagnoses in detail with patient. Medication risks/benefits/side effects discussed with patient. All of the patient's questions were addressed. The patient understands and agrees with our plan of care. The patient knows to call back if they are unsure of or forget any changes we discussed today or if the symptoms change. The patient received an After-Visit Summary which contains VS, orders, medication list and allergy list. This can be used as a \"mini-medical record\" should they have to seek medical care while out of town. Current Outpatient Medications on File Prior to Visit   Medication Sig Dispense Refill    lisinopril (PRINIVIL, ZESTRIL) 20 mg tablet TAKE 1 TABLET BY MOUTH ONCE DAILY 30 Tab 0     No current facility-administered medications on file prior to visit.

## 2019-11-21 NOTE — PATIENT INSTRUCTIONS
Bronchitis: Care Instructions Your Care Instructions Bronchitis is inflammation of the bronchial tubes, which carry air to the lungs. The tubes swell and produce mucus, or phlegm. The mucus and inflamed bronchial tubes make you cough. You may have trouble breathing. Most cases of bronchitis are caused by viruses like those that cause colds. Antibiotics usually do not help and they may be harmful. Bronchitis usually develops rapidly and lasts about 2 to 3 weeks in otherwise healthy people. Follow-up care is a key part of your treatment and safety. Be sure to make and go to all appointments, and call your doctor if you are having problems. It's also a good idea to know your test results and keep a list of the medicines you take. How can you care for yourself at home? · Take all medicines exactly as prescribed. Call your doctor if you think you are having a problem with your medicine. · Get some extra rest. 
· Take an over-the-counter pain medicine, such as acetaminophen (Tylenol), ibuprofen (Advil, Motrin), or naproxen (Aleve) to reduce fever and relieve body aches. Read and follow all instructions on the label. · Do not take two or more pain medicines at the same time unless the doctor told you to. Many pain medicines have acetaminophen, which is Tylenol. Too much acetaminophen (Tylenol) can be harmful. · Take an over-the-counter cough medicine that contains dextromethorphan to help quiet a dry, hacking cough so that you can sleep. Avoid cough medicines that have more than one active ingredient. Read and follow all instructions on the label. · Breathe moist air from a humidifier, hot shower, or sink filled with hot water. The heat and moisture will thin mucus so you can cough it out. · Do not smoke. Smoking can make bronchitis worse. If you need help quitting, talk to your doctor about stop-smoking programs and medicines. These can increase your chances of quitting for good. When should you call for help? Call 911 anytime you think you may need emergency care. For example, call if: 
  · You have severe trouble breathing.  
 Call your doctor now or seek immediate medical care if: 
  · You have new or worse trouble breathing.  
  · You cough up dark brown or bloody mucus (sputum).  
  · You have a new or higher fever.  
  · You have a new rash.  
 Watch closely for changes in your health, and be sure to contact your doctor if: 
  · You cough more deeply or more often, especially if you notice more mucus or a change in the color of your mucus.  
  · You are not getting better as expected. Where can you learn more? Go to http://mitra-megan.info/. Enter H333 in the search box to learn more about \"Bronchitis: Care Instructions. \" Current as of: June 9, 2019 Content Version: 12.2 © 8375-9141 [employer]. Care instructions adapted under license by [employer] (which disclaims liability or warranty for this information). If you have questions about a medical condition or this instruction, always ask your healthcare professional. [address] any warranty or liability for your use of this information.

## 2019-11-22 ENCOUNTER — TELEPHONE (OUTPATIENT)
Dept: FAMILY MEDICINE CLINIC | Age: 34
End: 2019-11-22

## 2019-11-22 DIAGNOSIS — E66.01 OBESITY, MORBID (HCC): Primary | ICD-10-CM

## 2019-11-22 LAB
ALBUMIN SERPL-MCNC: 4 G/DL (ref 3.5–5)
ALBUMIN/GLOB SERPL: 1.2 {RATIO} (ref 1.1–2.2)
ALP SERPL-CCNC: 82 U/L (ref 45–117)
ALT SERPL-CCNC: 53 U/L (ref 12–78)
ANION GAP SERPL CALC-SCNC: 6 MMOL/L (ref 5–15)
AST SERPL-CCNC: 30 U/L (ref 15–37)
BILIRUB SERPL-MCNC: 0.4 MG/DL (ref 0.2–1)
BUN SERPL-MCNC: 16 MG/DL (ref 6–20)
BUN/CREAT SERPL: 14 (ref 12–20)
CALCIUM SERPL-MCNC: 8.6 MG/DL (ref 8.5–10.1)
CHLORIDE SERPL-SCNC: 109 MMOL/L (ref 97–108)
CHOLEST SERPL-MCNC: 161 MG/DL
CO2 SERPL-SCNC: 26 MMOL/L (ref 21–32)
CREAT SERPL-MCNC: 1.13 MG/DL (ref 0.7–1.3)
GLOBULIN SER CALC-MCNC: 3.3 G/DL (ref 2–4)
GLUCOSE SERPL-MCNC: 88 MG/DL (ref 65–100)
HDLC SERPL-MCNC: 25 MG/DL
HDLC SERPL: 6.4 {RATIO} (ref 0–5)
LDLC SERPL CALC-MCNC: ABNORMAL MG/DL (ref 0–100)
LDLC SERPL DIRECT ASSAY-MCNC: 23 MG/DL (ref 0–100)
LIPID PROFILE,FLP: ABNORMAL
POTASSIUM SERPL-SCNC: 4.3 MMOL/L (ref 3.5–5.1)
PROT SERPL-MCNC: 7.3 G/DL (ref 6.4–8.2)
SODIUM SERPL-SCNC: 141 MMOL/L (ref 136–145)
TRIGL SERPL-MCNC: 420 MG/DL (ref ?–150)
VLDLC SERPL CALC-MCNC: ABNORMAL MG/DL

## 2019-11-22 RX ORDER — BUPROPION HYDROCHLORIDE 150 MG/1
TABLET ORAL
Qty: 90 TAB | Refills: 3 | Status: SHIPPED | OUTPATIENT
Start: 2019-11-22 | End: 2020-07-07

## 2019-11-22 NOTE — TELEPHONE ENCOUNTER
Called to advise pt of recent results. Kidney function still within normal limits, however it is worse than on last check. This could be due to dehydration. I would recommend he drink plenty of water and we can recheck this at his next visit. Lipid panel also shows that cholesterol overall looks good, but one piece, the triglycerides, were high. His ASCVD risk score is <5% so he would not meet criteria to start a statin on that alone and there is not clear evidence of benefit in targeting triglycerides to lower ASCVD risk. Discussed his FH and there is no significant CAD. Given this, will have him work on diet and lifestyle modifications, low cholesterol diet, to see if we can get this number down. Will recheck at next visit. Blood sugar and liver numbers normal.     He is also inquiring about bariatric surgery/options for weight loss. Discussed that with his BMI and comorbidities he would be a candidate, however he is concerned about the cost as he has no insurance. Discussed options for medication. Would not recommend phentermine given his HTN (although well controlled) and inability for close follow-up because he travels frequently with his job. Wellbutrin could be a good option, however. Confirmed that pt has never had a seizure. Will send rx to his pharmacy and pt will let us know how he is doing. He will continue to work on diet for weight loss as well. All questions answered and pt feels comfortable with the plan of care.

## 2020-07-07 ENCOUNTER — VIRTUAL VISIT (OUTPATIENT)
Dept: FAMILY MEDICINE CLINIC | Age: 35
End: 2020-07-07

## 2020-07-07 ENCOUNTER — HOSPITAL ENCOUNTER (OUTPATIENT)
Dept: LAB | Age: 35
Discharge: HOME | End: 2020-07-07

## 2020-07-07 DIAGNOSIS — R79.89 INCREASE IN CREATININE: ICD-10-CM

## 2020-07-07 DIAGNOSIS — M79.641 PAIN IN BOTH HANDS: ICD-10-CM

## 2020-07-07 DIAGNOSIS — E66.01 CLASS 3 SEVERE OBESITY DUE TO EXCESS CALORIES WITH BODY MASS INDEX (BMI) OF 45.0 TO 49.9 IN ADULT, UNSPECIFIED WHETHER SERIOUS COMORBIDITY PRESENT (HCC): ICD-10-CM

## 2020-07-07 DIAGNOSIS — I10 ESSENTIAL HYPERTENSION: ICD-10-CM

## 2020-07-07 DIAGNOSIS — E78.2 MIXED HYPERLIPIDEMIA: ICD-10-CM

## 2020-07-07 DIAGNOSIS — M79.642 PAIN IN BOTH HANDS: ICD-10-CM

## 2020-07-07 DIAGNOSIS — M79.641 PAIN IN BOTH HANDS: Primary | ICD-10-CM

## 2020-07-07 DIAGNOSIS — M79.642 PAIN IN BOTH HANDS: Primary | ICD-10-CM

## 2020-07-07 LAB
ANION GAP SERPL CALC-SCNC: 6 MMOL/L (ref 5–15)
BASOPHILS # BLD: 0 K/UL (ref 0–0.1)
BASOPHILS NFR BLD: 0 % (ref 0–1)
BUN SERPL-MCNC: 14 MG/DL (ref 6–20)
BUN/CREAT SERPL: 15 (ref 12–20)
CALCIUM SERPL-MCNC: 8.8 MG/DL (ref 8.5–10.1)
CHLORIDE SERPL-SCNC: 110 MMOL/L (ref 97–108)
CHOLEST SERPL-MCNC: 161 MG/DL
CO2 SERPL-SCNC: 24 MMOL/L (ref 21–32)
CREAT SERPL-MCNC: 0.93 MG/DL (ref 0.7–1.3)
CRP SERPL-MCNC: <0.29 MG/DL (ref 0–0.6)
DIFFERENTIAL METHOD BLD: ABNORMAL
EOSINOPHIL # BLD: 0.3 K/UL (ref 0–0.4)
EOSINOPHIL NFR BLD: 3 % (ref 0–7)
ERYTHROCYTE [DISTWIDTH] IN BLOOD BY AUTOMATED COUNT: 13.2 % (ref 11.5–14.5)
ERYTHROCYTE [SEDIMENTATION RATE] IN BLOOD: 7 MM/HR (ref 0–15)
EST. AVERAGE GLUCOSE BLD GHB EST-MCNC: 105 MG/DL
GLUCOSE SERPL-MCNC: 80 MG/DL (ref 65–100)
HBA1C MFR BLD: 5.3 % (ref 4–5.6)
HCT VFR BLD AUTO: 41.1 % (ref 36.6–50.3)
HDLC SERPL-MCNC: 30 MG/DL
HDLC SERPL: 5.4 {RATIO} (ref 0–5)
HGB BLD-MCNC: 13.3 G/DL (ref 12.1–17)
IMM GRANULOCYTES # BLD AUTO: 0 K/UL (ref 0–0.04)
IMM GRANULOCYTES NFR BLD AUTO: 0 % (ref 0–0.5)
LDLC SERPL CALC-MCNC: 74.4 MG/DL (ref 0–100)
LIPID PROFILE,FLP: ABNORMAL
LYMPHOCYTES # BLD: 3.9 K/UL (ref 0.8–3.5)
LYMPHOCYTES NFR BLD: 41 % (ref 12–49)
MCH RBC QN AUTO: 30.1 PG (ref 26–34)
MCHC RBC AUTO-ENTMCNC: 32.4 G/DL (ref 30–36.5)
MCV RBC AUTO: 93 FL (ref 80–99)
MONOCYTES # BLD: 0.7 K/UL (ref 0–1)
MONOCYTES NFR BLD: 8 % (ref 5–13)
NEUTS SEG # BLD: 4.6 K/UL (ref 1.8–8)
NEUTS SEG NFR BLD: 48 % (ref 32–75)
NRBC # BLD: 0 K/UL (ref 0–0.01)
NRBC BLD-RTO: 0 PER 100 WBC
PLATELET # BLD AUTO: 235 K/UL (ref 150–400)
PMV BLD AUTO: 9.9 FL (ref 8.9–12.9)
POTASSIUM SERPL-SCNC: 4.2 MMOL/L (ref 3.5–5.1)
RBC # BLD AUTO: 4.42 M/UL (ref 4.1–5.7)
RHEUMATOID FACT SERPL-ACNC: <10 IU/ML
SODIUM SERPL-SCNC: 140 MMOL/L (ref 136–145)
TRIGL SERPL-MCNC: 283 MG/DL (ref ?–150)
VLDLC SERPL CALC-MCNC: 56.6 MG/DL
WBC # BLD AUTO: 9.6 K/UL (ref 4.1–11.1)

## 2020-07-07 RX ORDER — NALTREXONE HYDROCHLORIDE 50 MG/1
25 TABLET, FILM COATED ORAL DAILY
Qty: 30 TAB | Refills: 2 | Status: SHIPPED | OUTPATIENT
Start: 2020-07-07

## 2020-07-07 RX ORDER — BUPROPION HYDROCHLORIDE 150 MG/1
150 TABLET, EXTENDED RELEASE ORAL 2 TIMES DAILY
Qty: 60 TAB | Refills: 2 | Status: SHIPPED | OUTPATIENT
Start: 2020-07-07 | End: 2021-01-06

## 2020-07-07 NOTE — PROGRESS NOTES
Terra Singh  30 y.o. male  1985  [address] [id number]  [id number]   [address]:    Telemedicine Progress Note  Zack Hurley MD       Encounter Date and Time: July 7, 2020 at 10:54 AM    Consent: Terra Singh, who was seen by synchronous (real-time) audio-video technology, and/or his healthcare decision maker, is aware that this patient-initiated, Telehealth encounter on 7/7/2020 is a billable service, with coverage as determined by his insurance carrier. He is aware that he may receive a bill and has provided verbal consent to proceed: Yes. Chief Complaint   Patient presents with    Hand Pain     History of Present Illness   Terra Singh is a 30 y.o. male was evaluated by synchronous (real-time) audio-video technology from home, through a secure patient portal.    Pt's primary number is [phone]. Pt complains of hand and knee pain that has been going on for 2 months. Hands: He reports that his knuckles have been hurting a lot. He has been dropping things. He doesn't feel like the hands are weak, but he just releases probably due to the pain. Pt states that this pain is bilateral and worse in the mornings. He works on his truck a lot. Pt denies numbness and tingling in the fingers. This is mostly in his PIP joints bilaterally. He feels that his fingers are swollen. Knees: Pt reports that he has bilateral knee pain that is worse in the mornings. He feels better as he starts moving. Pt endorses knee swelling bilaterally. 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 Pt is unsure of his family history as regard to arthritis. He has not had xrays. Pt had an elevation of his creatinine from baseline when his labs were checked in 11/2019. His baseline is around 0.7-0.8, but last time it was 1.13. In addition, he also had TG measuring above 400.  PCP and pt discussed lifestyle and diet modification at that time.    Pt was also wondering if there is a way to make his wellbutrin cheaper and if an additional medication can be prescribed. He feel like the wellbutrin is helping with his mood, but not as much for his appetite. Review of Systems   Review of Systems   Constitutional: Negative for chills, fever and malaise/fatigue. Eyes: Negative for blurred vision and pain. Respiratory: Negative for cough and shortness of breath. Cardiovascular: Negative for chest pain, palpitations and leg swelling. Musculoskeletal: Positive for joint pain and myalgias. Negative for back pain, falls and neck pain. Neurological: Negative for dizziness and headaches. Vitals/Objective:     General: alert, cooperative, no distress   Mental  status: mental status: alert, oriented to person, place, and time, normal mood, behavior, speech, dress, motor activity, and thought processes   Resp: resp: normal effort and no respiratory distress   Neuro: neuro: no gross deficits   Skin: skin: no discoloration or lesions of concern on visible areas   Due to this being a TeleHealth evaluation, many elements of the physical examination are unable to be assessed. Assessment and Plan:     1. Pain in both hands - does not sound characteristic of nerve pain or carpal's tunnel. Likely could be secondary to overuse, but given bilateral nature of pain and swelling especially at PIP joints and bilateral knee pain, will rule out any systemic inflammatory arthritis. - C REACTIVE PROTEIN, QT; Future  - SED RATE (ESR); Future  - ALYSSA, DIRECT, W/REFLEX; Future  - RHEUMATOID FACTOR, QT; Future  - CBC WITH AUTOMATED DIFF; Future    2. Mixed hyperlipidemia - TGs in 400s on last check. Diet and lifestyle modification were discussed. Will recheck this lab today as it has been 6 months. - LIPID PANEL; Future    3. Essential hypertension - encouraged pt to monitor blood pressure at home and report some measurements by the time I call with lab results. Due for routine lab work. Pt had creatinine that was in normal range, but elevated from his baseline, so it was recommended to monitor this as well. - METABOLIC PANEL, BASIC; Future    4. Class 3 severe obesity due to excess calories with body mass index (BMI) of 45.0 to 49.9 in adult, unspecified whether serious comorbidity present ([facility]  - HEMOGLOBIN A1C WITH EAG; Future  - Changed wellbutrin from extended release to standard release with hopes of improving cost  - Added naltrexone for hopes of better appetite control    5. Increase in creatinine - creatinine last 1.13, but previous baseline was from 0.7-0.8. Will check BMP today. Time spent in direct conversation with the patient to include medical condition(s) discussed, assessment and treatment plan:       We discussed the expected course, resolution and complications of the diagnosis(es) in detail. Medication risks, benefits, costs, interactions, and alternatives were discussed as indicated. I advised him to contact the office if his condition worsens, changes or fails to improve as anticipated. He expressed understanding with the diagnosis(es) and plan. Patient understands that this encounter was a temporary measure, and the importance of further follow up and examination was emphasized. Patient verbalized understanding. Patient informed to follow up: 6 months. Electronically Signed: MD James Simeonelias El is a 30 y.o. male who was evaluated by an audio-video encounter for concerns as above. Patient identification was verified prior to start of the visit. A caregiver was present when appropriate. Due to this being a TeleHealth encounter (During [facility]- public health emergency), evaluation of the following organ systems was limited: Vitals/Constitutional/EENT/Resp/CV/GI//MS/Neuro/Skin/Heme-Lymph-Imm.   Pursuant to the emergency declaration under the [address] waiver authority and the Coronavirus Preparedness and Response Supplemental Appropriations Act, this Virtual Visit was conducted, with patient's (and/or legal guardian's) consent, to reduce the patient's risk of exposure to COVID-19 and provide necessary medical care. Services were provided through a synchronous discussion virtually to substitute for in-person clinic visit. I was at home. The patient was at home. History   Patients past medical, surgical and family histories were reviewed and updated. Past Medical History:   Diagnosis Date    GERD (gastroesophageal reflux disease)     HTN (hypertension) 2013    Hypertriglyceridemia     Morbid obesity ([facility]      Past Surgical History:   Procedure Laterality Date    HX APPENDECTOMY  2014     No family history on file. Social History     Socioeconomic History    Marital status:      Spouse name: Not on file    Number of children: Not on file    Years of education: Not on file    Highest education level: Not on file   Occupational History    Not on file   Social Needs    Financial resource strain: Not on file    Food insecurity     Worry: Not on file     Inability: Not on file    Transportation needs     Medical: Not on file     Non-medical: Not on file   Tobacco Use    Smoking status: Former Smoker     Packs/day: 1.00     Years: 10.00     Pack years: 10.00     Last attempt to quit: 2012     Years since quittin.1    Smokeless tobacco: Current User     Types: Snuff   Substance and Sexual Activity    Alcohol use:  Yes     Alcohol/week: 0.0 standard drinks     Comment: Rare    Drug use: No    Sexual activity: Not on file   Lifestyle    Physical activity     Days per week: Not on file     Minutes per session: Not on file    Stress: Not on file   Relationships    Social connections     Talks on phone: Not on file     Gets together: Not on file     Attends Mu-ism service: Not on file     Active member of club or organization: Not on file Attends meetings of clubs or organizations: Not on file     Relationship status: Not on file    Intimate partner violence     Fear of current or ex partner: Not on file     Emotionally abused: Not on file     Physically abused: Not on file     Forced sexual activity: Not on file   Other Topics Concern    Not on file   Social History Narrative    Not on file     Patient Active Problem List   Diagnosis Code    HTN (hypertension) I10    Anxiety F41.9    GERD (gastroesophageal reflux disease) K21.9    Appendicitis, acute K35.80    Former smoker Z87.891    Obesity, morbid ([facility] E66.01          Current Medications/Allergies   Medications and Allergies reviewed:    Current Outpatient Medications   Medication Sig Dispense Refill    lisinopriL (PRINIVIL, ZESTRIL) 20 mg tablet Take 1 tablet by mouth once daily 90 Tab 0    buPROPion XL (WELLBUTRIN XL) 150 mg tablet Take one tab daily for week one. Then you can increase to 2 tabs daily.  90 Tab 3     Allergies   Allergen Reactions    Percocet [Oxycodone-Acetaminophen] Itching

## 2020-07-09 LAB — ANA SER QL: NEGATIVE

## 2020-07-15 ENCOUNTER — TELEPHONE (OUTPATIENT)
Dept: FAMILY MEDICINE CLINIC | Age: 35
End: 2020-07-15

## 2020-07-15 NOTE — PROGRESS NOTES
Inflammatory and autoimmune markers normal. TG have improved since starting diet and lifestyle modification, so will stick to this method.     David Mcmanus MD  Family Medicine Resident

## 2020-07-15 NOTE — TELEPHONE ENCOUNTER
Left message indicating that pt's lab work was all normal but the elevated TG. They have improved significantly from last the last draw since employing diet and lifestyle modification. Also indicated that the lab for the joint pain was normal and that pt should come in for office visit if this is still bothering him for physical exam and possible imaging. Will send letter with results.       Clarance Merlin, MD  Family Medicine Resident

## 2020-07-20 ENCOUNTER — DOCUMENTATION ONLY (OUTPATIENT)
Dept: SLEEP MEDICINE | Age: 35
End: 2020-07-20

## 2020-07-22 ENCOUNTER — DOCUMENTATION ONLY (OUTPATIENT)
Dept: SLEEP MEDICINE | Age: 35
End: 2020-07-22

## 2020-07-22 NOTE — PROGRESS NOTES
Patient called stating his provider is needing usage information from his CPAP and only sees leakage info. Informed patient the download includes the usage and shows his compliance. I called [facility] and left message with front staff to have nurse Sheyla to contact me to discuss further.  Will Also refax copy of CPAP download to [address] with information highlighted

## 2020-08-19 ENCOUNTER — DOCUMENTATION ONLY (OUTPATIENT)
Dept: SLEEP MEDICINE | Age: 35
End: 2020-08-19

## 2020-08-19 NOTE — PROGRESS NOTES
Patient called and stated [facility] told him, they did not receive download. Re-faxed to them again and informed patient as well.  (fax: [phone])

## 2020-09-29 DIAGNOSIS — I10 ESSENTIAL HYPERTENSION: ICD-10-CM

## 2020-09-29 RX ORDER — LISINOPRIL 20 MG/1
TABLET ORAL
Qty: 90 TAB | Refills: 0 | Status: SHIPPED | OUTPATIENT
Start: 2020-09-29 | End: 2021-01-07

## 2021-01-06 DIAGNOSIS — I10 ESSENTIAL HYPERTENSION: ICD-10-CM

## 2021-01-06 NOTE — LETTER
January 8, 2021 Brianna Wood [address] [place] [address] Dear Brianna Wood, Your Care Team cares about you and we want to make sure that your health care is complete. Our records indicate that you may be due for the following: 
 
Health Maintenance Due Topic Date Due  
 DTaP/Tdap/Td  (1 - Tdap) 01/09/2007  Yearly Flu Vaccine (1) 09/01/2020 Blood Pressure Check Please contact our office to address these important gaps in your health care by phone at [phone] or via [address] If you have completed this preventive service at another location, please notify us where the service was done so that we can update our records. We look forward to hearing from you soon. Sincerely, Your Care Team 
[phone]

## 2021-01-07 RX ORDER — LISINOPRIL 20 MG/1
TABLET ORAL
Qty: 90 TAB | Refills: 0 | Status: SHIPPED | OUTPATIENT
Start: 2021-01-07 | End: 2021-04-05 | Stop reason: SDUPTHER

## 2021-01-07 NOTE — TELEPHONE ENCOUNTER
Can we get him an appt to f/u chronic conditions? Virtual is fine unless he is not able to check his BP at home, in which case he will need an in-person appt in Feb. Thanks!

## 2021-01-08 NOTE — TELEPHONE ENCOUNTER
Attempted to call. unsuccessful.  message left  Pt will need to schedule a virtual follow up appointment for BP

## 2021-04-05 DIAGNOSIS — I10 ESSENTIAL HYPERTENSION: ICD-10-CM

## 2021-04-06 RX ORDER — LISINOPRIL 20 MG/1
TABLET ORAL
Qty: 90 TAB | Refills: 0 | Status: SHIPPED | OUTPATIENT
Start: 2021-04-06

## 2021-04-27 ENCOUNTER — TELEPHONE (OUTPATIENT)
Dept: FAMILY MEDICINE CLINIC | Age: 36
End: 2021-04-27

## 2022-03-18 PROBLEM — E66.01 OBESITY, MORBID (HCC): Status: ACTIVE | Noted: 2018-03-08

## 2023-05-10 RX ORDER — NALTREXONE HYDROCHLORIDE 50 MG/1
TABLET, FILM COATED ORAL DAILY
COMMUNITY
Start: 2020-07-07

## 2023-05-10 RX ORDER — BUPROPION HYDROCHLORIDE 150 MG/1
1 TABLET, EXTENDED RELEASE ORAL 2 TIMES DAILY
COMMUNITY
Start: 2021-01-06

## 2023-05-10 RX ORDER — LISINOPRIL 20 MG/1
1 TABLET ORAL DAILY
COMMUNITY
Start: 2021-04-06

## 2023-08-28 ENCOUNTER — NURSE TRIAGE (OUTPATIENT)
Dept: OTHER | Facility: CLINIC | Age: 38
End: 2023-08-28

## 2023-08-28 NOTE — TELEPHONE ENCOUNTER
[facility] [place] [facility]    Received call from Sara Avina at [facility]; Patient with [employer] Complaint requesting to establish care with new PCP at [facility]. Subjective: Caller states \"About a month ago, his feet swelled up, really bad. He had terrible pain in there, he had trouble walking. We would have him lay down and prop the feet up, that wouldn't help. I have fluid pills, so I gave him my medication and it still wouldn't take the swelling down. It would go away and then come right back the next day. His feet would swell up, like you couldn't see his ankles. He does have high BP. 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 Patient is currently unestablished. Writer advised patient to be seen at [facility] or ED Now. Patient agreeable. Care advice provided, patient verbalizes understanding; denies any other questions or concerns; instructed to call back for any new or worsening symptoms. Patient/caller agrees to proceed to [facility] Emergency Department. Attention Provider: Thank you for allowing me to participate in the care of your patient. The patient was connected to triage in response to information provided to the [facility]. Please do not respond through this encounter as the response is not directed to a shared pool.     Reason for Disposition   Can't walk or

## 2023-08-29 ENCOUNTER — APPOINTMENT (OUTPATIENT)
Facility: HOSPITAL | Age: 38
End: 2023-08-29
Payer: COMMERCIAL

## 2023-08-29 ENCOUNTER — APPOINTMENT (OUTPATIENT)
Dept: VASCULAR SURGERY | Facility: HOSPITAL | Age: 38
End: 2023-08-29
Payer: COMMERCIAL

## 2023-08-29 ENCOUNTER — HOSPITAL ENCOUNTER (EMERGENCY)
Facility: HOSPITAL | Age: 38
Discharge: HOME | End: 2023-08-29
Attending: EMERGENCY MEDICINE
Payer: COMMERCIAL

## 2023-08-29 VITALS
TEMPERATURE: 98 F | WEIGHT: 315 LBS | OXYGEN SATURATION: 99 % | HEART RATE: 72 BPM | BODY MASS INDEX: 39.17 KG/M2 | DIASTOLIC BLOOD PRESSURE: 117 MMHG | SYSTOLIC BLOOD PRESSURE: 184 MMHG | HEIGHT: 75 IN | RESPIRATION RATE: 20 BRPM

## 2023-08-29 DIAGNOSIS — M79.89 LEG SWELLING: Primary | ICD-10-CM

## 2023-08-29 DIAGNOSIS — M77.32 CALCANEAL SPUR OF LEFT FOOT: ICD-10-CM

## 2023-08-29 DIAGNOSIS — I10 ESSENTIAL HYPERTENSION: ICD-10-CM

## 2023-08-29 LAB
ALBUMIN SERPL-MCNC: 3.5 G/DL (ref 3.5–5)
ALBUMIN/GLOB SERPL: 0.9 (ref 1.1–2.2)
ALP SERPL-CCNC: 102 U/L (ref 45–117)
ALT SERPL-CCNC: 44 U/L (ref 12–78)
ANION GAP SERPL CALC-SCNC: 3 MMOL/L (ref 5–15)
AST SERPL-CCNC: 18 U/L (ref 15–37)
BASOPHILS # BLD: 0 K/UL (ref 0–0.1)
BASOPHILS NFR BLD: 1 % (ref 0–1)
BILIRUB SERPL-MCNC: 0.3 MG/DL (ref 0.2–1)
BUN SERPL-MCNC: 10 MG/DL (ref 6–20)
BUN/CREAT SERPL: 12 (ref 12–20)
CALCIUM SERPL-MCNC: 8.7 MG/DL (ref 8.5–10.1)
CHLORIDE SERPL-SCNC: 110 MMOL/L (ref 97–108)
CO2 SERPL-SCNC: 28 MMOL/L (ref 21–32)
CREAT SERPL-MCNC: 0.83 MG/DL (ref 0.7–1.3)
DIFFERENTIAL METHOD BLD: NORMAL
ECHO BSA: 3.02 M2
EOSINOPHIL # BLD: 0.4 K/UL (ref 0–0.4)
EOSINOPHIL NFR BLD: 5 % (ref 0–7)
ERYTHROCYTE [DISTWIDTH] IN BLOOD BY AUTOMATED COUNT: 12.4 % (ref 11.5–14.5)
GLOBULIN SER CALC-MCNC: 3.8 G/DL (ref 2–4)
GLUCOSE SERPL-MCNC: 162 MG/DL (ref 65–100)
HCT VFR BLD AUTO: 43.1 % (ref 36.6–50.3)
HGB BLD-MCNC: 14.5 G/DL (ref 12.1–17)
IMM GRANULOCYTES # BLD AUTO: 0 K/UL (ref 0–0.04)
IMM GRANULOCYTES NFR BLD AUTO: 0 % (ref 0–0.5)
LYMPHOCYTES # BLD: 3.2 K/UL (ref 0.8–3.5)
LYMPHOCYTES NFR BLD: 39 % (ref 12–49)
MCH RBC QN AUTO: 29.7 PG (ref 26–34)
MCHC RBC AUTO-ENTMCNC: 33.6 G/DL (ref 30–36.5)
MCV RBC AUTO: 88.1 FL (ref 80–99)
MONOCYTES # BLD: 0.5 K/UL (ref 0–1)
MONOCYTES NFR BLD: 6 % (ref 5–13)
NEUTS SEG # BLD: 4.1 K/UL (ref 1.8–8)
NEUTS SEG NFR BLD: 49 % (ref 32–75)
NRBC # BLD: 0 K/UL (ref 0–0.01)
NRBC BLD-RTO: 0 PER 100 WBC
PLATELET # BLD AUTO: 190 K/UL (ref 150–400)
PMV BLD AUTO: 9.4 FL (ref 8.9–12.9)
POTASSIUM SERPL-SCNC: 4.2 MMOL/L (ref 3.5–5.1)
PROT SERPL-MCNC: 7.3 G/DL (ref 6.4–8.2)
RBC # BLD AUTO: 4.89 M/UL (ref 4.1–5.7)
SODIUM SERPL-SCNC: 141 MMOL/L (ref 136–145)
WBC # BLD AUTO: 8.2 K/UL (ref 4.1–11.1)

## 2023-08-29 PROCEDURE — 73650 X-RAY EXAM OF HEEL: CPT

## 2023-08-29 PROCEDURE — 36415 COLL VENOUS BLD VENIPUNCTURE: CPT

## 2023-08-29 PROCEDURE — 6370000000 HC RX 637 (ALT 250 FOR IP): Performed by: NURSE PRACTITIONER

## 2023-08-29 PROCEDURE — 80053 COMPREHEN METABOLIC PANEL: CPT

## 2023-08-29 PROCEDURE — 85025 COMPLETE CBC W/AUTO DIFF WBC: CPT

## 2023-08-29 PROCEDURE — 93970 EXTREMITY STUDY: CPT

## 2023-08-29 PROCEDURE — 96374 THER/PROPH/DIAG INJ IV PUSH: CPT

## 2023-08-29 PROCEDURE — 6360000002 HC RX W HCPCS: Performed by: NURSE PRACTITIONER

## 2023-08-29 PROCEDURE — 99285 EMERGENCY DEPT VISIT HI MDM: CPT

## 2023-08-29 PROCEDURE — 93005 ELECTROCARDIOGRAM TRACING: CPT | Performed by: NURSE PRACTITIONER

## 2023-08-29 RX ORDER — FUROSEMIDE 20 MG/1
20 TABLET ORAL 2 TIMES DAILY
Qty: 4 TABLET | Refills: 0 | Status: SHIPPED | OUTPATIENT
Start: 2023-08-29 | End: 2023-08-31

## 2023-08-29 RX ORDER — KETOROLAC TROMETHAMINE 30 MG/ML
30 INJECTION, SOLUTION INTRAMUSCULAR; INTRAVENOUS
Status: COMPLETED | OUTPATIENT
Start: 2023-08-29 | End: 2023-08-29

## 2023-08-29 RX ORDER — LISINOPRIL 20 MG/1
20 TABLET ORAL
Status: COMPLETED | OUTPATIENT
Start: 2023-08-29 | End: 2023-08-29

## 2023-08-29 RX ORDER — CLONIDINE HYDROCHLORIDE 0.1 MG/1
0.1 TABLET ORAL
Status: COMPLETED | OUTPATIENT
Start: 2023-08-29 | End: 2023-08-29

## 2023-08-29 RX ORDER — LISINOPRIL 20 MG/1
20 TABLET ORAL DAILY
Qty: 90 TABLET | Refills: 1 | Status: SHIPPED | OUTPATIENT
Start: 2023-08-29

## 2023-08-29 RX ORDER — NAPROXEN 500 MG/1
500 TABLET ORAL 2 TIMES DAILY
Qty: 60 TABLET | Refills: 0 | Status: SHIPPED | OUTPATIENT
Start: 2023-08-29

## 2023-08-29 RX ADMIN — LISINOPRIL 20 MG: 20 TABLET ORAL at 13:26

## 2023-08-29 RX ADMIN — KETOROLAC TROMETHAMINE 30 MG: 30 INJECTION, SOLUTION INTRAMUSCULAR; INTRAVENOUS at 13:19

## 2023-08-29 RX ADMIN — CLONIDINE HYDROCHLORIDE 0.1 MG: 0.1 TABLET ORAL at 13:19

## 2023-08-29 ASSESSMENT — ENCOUNTER SYMPTOMS
ABDOMINAL PAIN: 0
COUGH: 0
SHORTNESS OF BREATH: 0

## 2023-08-29 ASSESSMENT — PAIN SCALES - GENERAL: PAINLEVEL_OUTOF10: 4

## 2023-08-29 ASSESSMENT — PAIN - FUNCTIONAL ASSESSMENT: PAIN_FUNCTIONAL_ASSESSMENT: 0-10

## 2023-08-29 ASSESSMENT — PAIN DESCRIPTION - LOCATION: LOCATION: FOOT

## 2023-08-29 NOTE — ED TRIAGE NOTES
Patient arrives to the ED via POV with complaints of bilateral leg swelling that started a month ago. Patient reports pain to L heel. Denies CP, SOB.

## 2023-08-30 LAB
EKG ATRIAL RATE: 68 BPM
EKG DIAGNOSIS: NORMAL
EKG P AXIS: 8 DEGREES
EKG P-R INTERVAL: 136 MS
EKG Q-T INTERVAL: 382 MS
EKG QRS DURATION: 96 MS
EKG QTC CALCULATION (BAZETT): 406 MS
EKG R AXIS: 11 DEGREES
EKG T AXIS: 37 DEGREES
EKG VENTRICULAR RATE: 68 BPM

## 2023-08-30 PROCEDURE — 93010 ELECTROCARDIOGRAM REPORT: CPT | Performed by: INTERNAL MEDICINE

## 2024-12-09 NOTE — PROGRESS NOTES
[address] Medicine Residency Attending Addendum:  Dr. Otilia Ramirez MD,  the patient and I were not physically present during this encounter. The resident and I are concurrently monitoring the patient care through appropriate telecommunication technology. I discussed the findings, assessment and plan with the resident and agree with the resident's findings and plan as documented in the resident's note.       Merlin Singer, MD Detail Level: Detailed Add 1585x Cpt? (Do Not Bill If You Billed For The Procedure Placing The Sutures. This Is An Add-On Code That Must Be Billed With An E/M Visit Code): No Suture Removal Completed By (Optional): MB